# Patient Record
Sex: FEMALE | Race: WHITE | NOT HISPANIC OR LATINO | URBAN - METROPOLITAN AREA
[De-identification: names, ages, dates, MRNs, and addresses within clinical notes are randomized per-mention and may not be internally consistent; named-entity substitution may affect disease eponyms.]

---

## 2019-02-04 ENCOUNTER — RECORDS - HEALTHEAST (OUTPATIENT)
Dept: LAB | Facility: CLINIC | Age: 19
End: 2019-02-04

## 2019-02-05 LAB
O+P STL MICRO: NORMAL
SHIGA TOXIN 1: NEGATIVE
SHIGA TOXIN 2: NEGATIVE

## 2019-02-06 ENCOUNTER — RECORDS - HEALTHEAST (OUTPATIENT)
Dept: LAB | Facility: CLINIC | Age: 19
End: 2019-02-06

## 2019-02-06 LAB — G LAMBLIA AG STL QL IA: NORMAL

## 2019-02-07 LAB — BACTERIA SPEC CULT: NORMAL

## 2019-02-08 LAB
O+P STL MICRO: NORMAL
O+P STL MICRO: NORMAL

## 2019-10-21 ENCOUNTER — RECORDS - HEALTHEAST (OUTPATIENT)
Dept: LAB | Facility: CLINIC | Age: 19
End: 2019-10-21

## 2019-10-24 LAB
EBV EA-D IGG SER-ACNC: <0.2 AI (ref 0–0.8)
EBV NA IGG SER IA-ACNC: <0.2 AI (ref 0–0.8)
EBV VCA IGG SER IA-ACNC: <0.2 AI (ref 0–0.8)
EBV VCA IGM SER IA-ACNC: 0.6 AI (ref 0–0.8)

## 2019-11-30 ENCOUNTER — RECORDS - HEALTHEAST (OUTPATIENT)
Dept: LAB | Facility: CLINIC | Age: 19
End: 2019-11-30

## 2019-12-01 LAB
CMV DNA SPECIMEN TYPE: NORMAL
CMV QUANT IU/ML: NORMAL [IU]/ML
LOG IU/ML OF CMVQNT: NORMAL {LOG_IU}/ML

## 2019-12-02 LAB
CMV IGM SERPL IA-ACNC: <0.2 AI (ref 0–0.8)
EBV EA-D IGG SER-ACNC: <0.2 AI (ref 0–0.8)
EBV NA IGG SER IA-ACNC: <0.2 AI (ref 0–0.8)
EBV VCA IGG SER IA-ACNC: <0.2 AI (ref 0–0.8)
EBV VCA IGM SER IA-ACNC: 0.5 AI (ref 0–0.8)

## 2020-01-14 ENCOUNTER — RECORDS - HEALTHEAST (OUTPATIENT)
Dept: ADMINISTRATIVE | Facility: OTHER | Age: 20
End: 2020-01-14

## 2020-01-15 ENCOUNTER — HOSPITAL ENCOUNTER (OUTPATIENT)
Dept: ULTRASOUND IMAGING | Facility: CLINIC | Age: 20
Discharge: HOME | End: 2020-01-15
Attending: PEDIATRICS

## 2020-01-15 DIAGNOSIS — R10.812 LUQ ABDOMINAL TENDERNESS: ICD-10-CM

## 2024-07-31 ENCOUNTER — TELEPHONE (OUTPATIENT)
Dept: OTOLARYNGOLOGY | Facility: CLINIC | Age: 24
End: 2024-07-31

## 2024-07-31 NOTE — TELEPHONE ENCOUNTER
[facility] Call Center    Phone Message    May a detailed message be left on voicemail: yes     Reason for Call: Other: The pt's mom Loreto Mccurdy, is calling about her daughter. Jody has been Recommended by Kathya Milner.  The pt has social anxiety that is affecting her speech. She needs therapy as she is holding her breathe then tenses up, so that she has problems with projectin her voice, etc. Loreto thinks it is a mental block that created a habit. 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 Please call the pt's mom to discuss if you could see/help this pt. Thanks.     Action Taken: Message routed to:  [facility] ENT    Travel Screening: Not Applicable     Date of Service:

## 2024-08-01 NOTE — TELEPHONE ENCOUNTER
I tried to contact the family. I have 8/20 on hold for her to have a new 2 hour.  If you try to contact them, please be clear that my next available is after she leaves, so it is important for her to try to make the 8/20 appointment.    Thank you! -Jody

## 2024-08-06 ENCOUNTER — TELEPHONE (OUTPATIENT)
Dept: OTOLARYNGOLOGY | Facility: CLINIC | Age: 24
End: 2024-08-06

## 2024-08-09 ENCOUNTER — THERAPY VISIT (OUTPATIENT)
Dept: OTOLARYNGOLOGY | Facility: CLINIC | Age: 24
End: 2024-08-09

## 2024-08-09 DIAGNOSIS — R49.0 DYSPHONIA: Primary | ICD-10-CM

## 2024-08-09 PROCEDURE — 92524 BEHAVRAL QUALIT ANALYS VOICE: CPT | Mod: GN | Performed by: SPEECH-LANGUAGE PATHOLOGIST

## 2024-08-09 PROCEDURE — 92507 TX SP LANG VOICE COMM INDIV: CPT | Mod: GN | Performed by: SPEECH-LANGUAGE PATHOLOGIST

## 2024-08-09 PROCEDURE — 31579 LARYNGOSCOPY TELESCOPIC: CPT | Performed by: SPEECH-LANGUAGE PATHOLOGIST

## 2024-08-09 PROCEDURE — 92520 LARYNGEAL FUNCTION STUDIES: CPT | Mod: GN | Performed by: SPEECH-LANGUAGE PATHOLOGIST

## 2024-08-09 NOTE — PATIENT INSTRUCTIONS
"After Visit Summary    Patient: Emily Mccurdy  Date of Visit: 2024    These notes are also available in your MyChart. Please take a few moments to find them under \"Past Appointments\" in the [employer] system, as Jody will start to phase out e-mail communications.    \"Handouts\" that go along with today's order of activities include (below):   Frequency of practice: 2-3x/day unless marked otherwise    Order of today's appointment:  Breathing:   In the morning and evening (twice daily) for 2-5 minutes:   Breathe while lying on your back with your face and knees up. Hands on tummy and chest.  Take a breath in with rounded lips and exhale with a  shhhhhh    Inhale  = Inflate; exhale = deflate  3x each: try breathin in/8 out (5/10, 3/4)  Throughout the day (2-3x/day for just a couple minutes) check breathing while keeping shoulders relaxed (riding to and from school, etc.)    Pulsed breathing Exercise (2-3x/day):   3x each  Sh, Sh, Sh...  S, S, S...  F, F, F.....  Now, all three combined Sh, Sh, Sh, S, S, S, F, F, F (tummy IN for each sound)    Shh exercise (loudness) : 1) one on one loudness 2) 2-3 people 3) 5-6 people loudness.  Feel breath in the abdominal area and not in the throat.  Then, try \"Hey\" at different loudness.     Breath Pacing:   Coordinate sipping breath with counting:  Breathe in through rounded lips with tongue behind lower teeth or touching at the bump behind upper teeth:  Breathe in through rounded lips, then speak \"1\"  Breathe in through rounded lips, then speak  \"1-2\"  Breathe in through rounded lips, then speak  \"1-2-3\"  Breathe in through rounded lips, then speak  \"1-2-3-4\"... up to 5, and then keep going up to 10      Breathing Techniques:  1. Breathing in through rounded lips and out with a \"sh\"  2. Breathing in through the nose and out with \"sh\"  Breathing Tips  Keep shoulders down and chest relaxed    When the sunlight strikes raindrops in the air,/ they act like a prism and form a " "rainbow. / The rainbow is a division of white light/ into many beautiful colors./  These take the shape of a long round arch,/ with its path high above,/ and its two ends apparently ' beyond the horizon./  There is, according to legend, /a boiling pot of gold at one end. / People look, but no one ever finds it./  When a man looks for something beyond his reach,/ his friends say/ he is looking for the pot of gold at the end of the rainbow./         Cup and Bubble Exercises   WHY: Though these exercises seems (and feels) silly they are helpful for a number of reasons. First, they make you use your air generously and consistently, helping you to coordinate your breath and your voice. Second, they lengthen and narrow the vocal tract with the straw. This narrowing (or semi-occlusion in scientific terms) creates back pressure in your throat which has been shown to help the vocal folds vibrate more easily and reduce how hard some of the other muscles are squeezing.   HOW:   With Water - Fill the cup (or bottle) about 2 inches full of water. Blow bubbles through the straw while keeping your voice on. (kind of like making an \"ooooo\" sound through straw).Keep the water bubbling the whole time. That means your air is moving consistently.   Without Water - make sound through the straw (like it's a kazoo). Make sure you are using your air freely. You can hold your hand in front of the straw to test, and you should be able to feel the air moving.   Use the \"w\" sound without the straw. Let your cheeks puff up slightly (like Ahsan Mann). Maintain the relaxed feeling in your throat while focusing on a sense of buzz at your lips.   After easy sounds listed below speak through the straw (with or without water) using every day phrases and counting to help bridge between the exercises and everyday voice use.   Feel how open and relaxed your throat feels when you practice these sounds. If the bubbling or air stops or it gets tight, " don't sweat it. Just breath, relax, and start again. Across all the exercises make sure you are getting a nice low breath and feeling the steady inward motion of low abdominal muscles when making sound.   Practice 5 times a day for no more than 3 minutes, and whenever you feel fatigued.   Aren't sure if you are doing it right? Ask yourself these three questions:   1. Does it feel easy?   2. Are the bubbles and voice consistent?   3. Do you feel that forward buzz?     What sounds to make:   Single pitches - use any comfortable pitch and sustain the note for as long as it feels free and easy, and your lips or tongue are bubbling.   Sighs - glide from high to low like you are sitting down in a comfortable chair after a long day of work   [place] - Start on a medium pitch and glide up just a bit and back down   On and off - use a comfortable pitch near where you speak. Keep the bubbles moving consistently while turning the voice on and off. Recognize how little work you need to do to get the voice working.         Jody Schwartz M.M. (voice), M.A., CCC/SLP  Speech-Language Pathologist  [facility] Certified Vocologist  [facility]  [email or web address]  she/her    Muscle Tension Dysphonia    One of the most common voice disorders we treat at the [facility] is muscle tension dysphonia (MTD).  The root word phon means  sound .  Phonation refers to the sound made by the voice.  The term dysphonia means there is something wrong with the voice.  However, muscle tension dysphonia can also refer to a voice that sounds normal but causes pain, discomfort, or fatigue to the voice user.  MTD is known as a functional disorder; that is, there is nothing structurally wrong with the voice.  Rather, the muscles do not function properly, which causes poor sound, discomfort, or increased effort.    Symptoms of MTD    Different individuals may have very different symptoms of MTD.  Possible voice characteristics include     rough, hoarse,  gravelly, raspy     weak, breathy, airy, leaky, backward, hollow     strained, pressed, squeezed, tight, tense, choked, effortful     jerky, shaky, halting,     suddenly cutting out, squeezing shut, breaking off, changing pitch, or fading away     giving out gradually, or becoming weaker or more tense as voice use continues     excessively high or low pitch     inability to produce a loud or clear voice     inability to sing notes that used to be easy    Possible sensations of MTD include     pain or discomfort anywhere in the throat  area associated with voice use     a tight choking sensation with voice use     fatigue or effort that increases with voice use     some area of the neck becoming tender to the touch     feeling a need to clear the throat frequently     a feeling of a lump in the throat    Causes of Muscle Tension Dysphonia  There are many specific, individual reasons why use of the vocal mechanism becomes abnormal.  Some common causes are prolonged illness, prolonged voice overuse, prolonged voice underuse (such as after a surgery), and trauma, such as an injury, chemical exposure, or an emotionally traumatic event.  These lead to an abnormal vocal response, causing the individual to compensate by using extra effort while talking.    The onset of MTD can be very subtle.  The individual is usually unaware of the extra effort, but this extra effort typically recruits muscles that are not part of the larynx (also known as the voice box).  The result may or may not be a stronger voice, but a vicious cycle usually starts in which more and more effort is required.  This cycle may continue for months or even years before the individual becomes aware that the voice is abnormal.    Treatment of MTD  Functional voice therapy is usually the only treatment available for MTD.  The amount of time required to complete functional therapy varies from only a few sessions to many months, but generally some relief is  "gained in the first 4 to 6 sessions.  In cases of emotional stress, counseling or stress management may be helpful or even necessary.    Occasionally, medical or surgical treatments may be tried.  Botox injections may be useful in severe cases.  Surgery has been tried in very few cases but is not done at the [facility].  Muscle relaxants are NOT useful for muscle tension dysphonia.  The action of these drugs is not localized to the vocal mechanism, so in order to provide enough relaxation for the vocal mechanism, the individual is often unable to function for day-to-day living.    Types of Muscle Tension Dysphonia  Muscle tension in the vocal mechanism can be exhibited in many ways.  Each individual is different, but a few common patterns are:     Anterior-posterior constriction     Hyperabduction     Hyperadduction     Pharyngeal constriction     Ventricular phonation     Vocal fold bowing (explained in another brochure)     back           r  i  g  h  t       front   A cross-section of the larynx (voice box) as seen from above.  This diagram may be helpful for visualizing the descriptions below.    Anterior-Posterior Constriction  In anterior-posterior constriction, the arytenoid cartilages bend forward during voice use, and/or the epiglottis bends backwards, causing the larynx to squeeze from front to back.  As effort increases, squeezing continues until the vocal folds (also called vocal cords) cannot be seen and may be unable to vibrate.  In extreme cases, especially in children, the arytenoids may actually vibrate against the epiglottis. The sound of the voice for someone with anterior-posterior constriction could range from normal to extremely squeezed and tight.  The voice may sound rough if the squeezing causes irregular vibration of the vocal folds.  Some experience a \"froggy\" sound if the arytenoids and epiglottis vibrate.  Someone with anterior-posterior constriction may complain of discomfort, " increasing pain during voice use that may remain during rest, or fatigue and decline of voice quality as the voice is used more and more.    Hyperabduction  Abduction is the term for the vocal folds coming apart during breathing.  When a person has hyperabduction, the vocal folds do not sufficiently come together to produce voice.  They may appear to be pulled apart as the person phonates.  An emotional component may be present with this type of MTD.    A person with hyperabduction may have a weak, breathy, airy, very soft, or hollow voice, sometimes with breaks in phonation.  He or she may experience effort and fatigue.  Often times, functional therapy is combined with psychotherapy to treat hyperabduction.  In very severe cases, Botox injections are also a useful treatment.    Hyperadduction  In hyperadduction, the vocal folds adduct (come together) excessively tightly, restricting airflow during phonation.  The larynx may look normal in an exam, but the sound and sensation are not.    The sound of a voice with hyperadduction ranges from normal to extremely tight, pressed, squeezed, strangled, forced or effortful.  The muscle tension may be irregular, causing a stopping/starting or shaking effect.  A person with hyperadduction may experience pain, discomfort, and effort and fatigue, usually increasing with continued voice use.  In very severe cases, Botox injections are helpful    Pharyngeal Constriction  During pharyngeal constriction, muscles of the pharynx (throat) contract excessively during voice use, making the throat very constricted.  The sound of the voice ranges from normal to very tight or squeezed.  It may be tremulous or throaty sounding.  A person with pharyngeal constriction may experience discomfort, pain, fatigue, or declining voice quality with use.  Pain usually increases with voice use but may remain during rest.  Sometimes emotional stress can provoke pharyngeal constriction.    Ventricular  Phonation  This type of MTD is also called plica ventricularis, ventricular dysphonia, or false cord phonation.  The ventricular folds come together and vibrate instead of, or along with, the vocal folds.  The ventricular folds, also known as the false vocal cords, are mounds of fleshy tissue just above the vocal folds.  Though the ventricular folds are not muscular, they can be brought together and vibrated.  However, they were not meant to vibrate, so they cannot produce high pitches or loud sounds.  Pressure from the ventricular folds is usually strong enough to keep the true vocal folds from vibrating.  Most often, ventricular phonation is caused by continued voice use while true vocal folds are impaired, though sometimes extreme strain in response to a trauma is the cause.    Ventricular phonation sounds very rough and strained, sometimes inhuman, limited in pitch and volume.  One who uses ventricular phonation may experience fatigue (especially with attempts at loud voice use), pain or dryness with phonation.  However, sometimes no discomfort will be sensed at all.  In extreme cases, medical or surgical treatments may be tried to treat ventricular phonation, but only after functional therapy has failed.  In some cases, ventricular phonation is the best alternative for persons whose true vocal folds will always be too impaired to vibrate.

## 2024-08-09 NOTE — PROGRESS NOTES
"[facility]  [facility] - Dept. of Otolaryngology   Evaluation report - IN PERSON APPOINTMENT    Clinician: WILVER Olson.STACIA VELAZQUEZ (voice), M.A., CCC-SLP  Referring physician:  Self  Patient: Emily Mccurdy  Date of Visit: 8/9/2024  # of Visits: 1  # of Therapy sessions: 1    Benefit & Certification period: n/a  No insurance.  They have acknowledged and I will ask Eric Brenda to reach out to the family.      HISTORY    Chief complaint: Calli is a 23 year old presenting today for evaluation of breath coordination with phonation/ muscle tension difficulties.  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    Salient history: She has a history significant for a long Hx of social anxiety disorder that affected her voice and speech. In message prior to her appointment, Emily stated:  \"The problem is not mechanical/structural, and I have confirmed with several doctors that there is no impediment to my breathing or speech. My guess would be that it s more of a mental block-- my chest and throat consistently constrict when I try to talk, and I end up with a sore throat. I feel as though I ve closed my nose off and speak in one single exhale.    I think I m trying so hard to sound a certain way (ie, friendly, natural, upbeat), that I ve forgotten how to speak normally.\"    We were joined by: her mother, Loreto  CURRENT SYMPTOMS INCLUDE  VOICE:   Onset: tightness / chest is tight before speaking started 1.5 years ago.  Was tiking a tripl in 2023./ severe social anxiety.   Long Hx of a social anxiety disorder that makes her speak in a constant \"high pitch Naveen remigio\" voice.  So, in more recent years, she \"tried\" to \"push\" her voice into a lower place that sounded more authentic to her, but led to tension and vocal fatigue.   Lots of tenderness in the TH space. Breath is shallow, and she often finds herself trying to get " "her words out on one breath.  Develped an alterego and high pitched voice to talk with a squeaky high pitch voice. Singled out and an anxiety reflex.   Trying to let it go more an more  3 years ago she tried to go back to her \"real\" voice.  Tried to force herself to sound nature. Forcing and trying too hard - over excited garret go back.  Stuffy nose, and semi-obstructive - little sollen and inflammed. Tried things. Feels like talking on one entire exhale and nose feels blocked.     Enjoys singing for fun.  3x musical drop out. An dthen a fwe years  Hums to herself - prabhakar clarke   Singing voice still short of breath and a mild sore throat. Enjoys it, but still similar results.   Public engagements and today sounds pretty competent sounding, but the rest of the day it is normal for 5 min and then she will start feeling tighter and more constrained. Focusing on rose and trying to sound sincere.    Used voice in school every day.  Sweet voice took a while to resolve  Speaking regularly and consistency - remote job was very understimulating - introvert, but needs to be social. Legal research nisha torres.     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 More hopeful and more stimulation.  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 Will not need to enforce dicipline   (gradual/sudden)  Code switching school and at home.  At home started to modify her voice for no reason.     THROAT ISSUES:   Effortful swallow  Soreness and pain    RESPIRATION:   Struggle for a long time, feeling insufficent air - yawn ing and light headed  Feedback look  O2 sats are ok  No problem.    SWALLOWING:   Takes longer than anyone to finish food, and needs to chew more  Difficulty breathing and eating at the same time.   No choking solids or liquids  Pills are difficult - prozac as a child and was difficult. Now able swallow, but still not fun. "     ADDITIONAL:  Reflux: father has reflux. No heartburn.     OTHER PERTINENT HISTORY  Otherwise unknown.  Please also refer to her chart for additional details.  No past medical history on file.  No past surgical history on file.    OBJECTIVE  PATIENT REPORTED MEASURES:   Patient Supplied Answers To VHI Questionnaire       No data to display                Patient Supplied Answers To CSI Questionnaire       No data to display                Patient Supplied Answers To EAT Questionnaire       No data to display                PERCEPTUAL EVALUATION (CPT 42714)  POSTURE / TENSION/ PALPATION OF THE LARYNGEAL AREA:   upper body    BREATHING:   appears within normal limits and adequate at rest  clavicular elevation on inspiration  shoulder and neck involvement  phonation is not coordinated with respiration  Intermittent yawning while conversing    LARYNGEAL PALPATION:   firm musculature  tenderness of the thyrohyoid area  reduced thyrohyoid space  left greater than right    VOICE:  Geok states that today is a typical voice today, with clinician observing voice quality to be characterized as:  Roughness: WNL  Breathiness: WNL  Strain: WNL  Pitch:  F0/Habitual/Conversational speech:  Mildly elevated  Voiced /i/ Max Phonation Time: informally judged as WNL  Voiceless /s/ max: informally judged as WNL  Pitch glide: neurologically normal  Maximum Phonation Time: 10+ seconds informally judged as WNL  Resonance:   Conversational speech:  mildly narrowed resonance  Loudness  Conversational speech:  WNL  Projected speech:  difficulty projecting, leads to strain  Singing vs. Speech:  n/a  GLOBAL ASSESSMENT OF DYSPHONIA: 30/100    COUGH/THROAT CLEARING:  Not observed/ present    LARYNGEAL FUNCTION STUDIES (CPT 92198)      /a/ mean f0 = 234.941 Hz (SD = 17.270)  /i/ mean f0 = 265.571 Hz (SD = 2.465)  Glide:     Lowest f0 = 84.092 Hz      Higest f0 = 872.410 Hz      Range = 788.318 Hz   Connected Speech     Mean f0 = 224.929 Hz (SD  33.187)     Median f0 = 220.725 Hz      Lowest f0 = 89.385 Hz      Highest f0 = 356.634 Hz      Speaking Range = 267.249 Hz         LARYNGEAL EXAMINATION  Procedure: Flexible endoscopy with chip-tip technology with stroboscopy, right nostril; topical anesthesia with 2% lidocaine and 0.025% oxymetazoline was sprayed into the nasal cavity and allowed 3 to 5 minutes for effect.  Performed by: WILVER Olson.STACIA VELAZQUEZ (voice), M.A., CCC-SLP  The laryngeal and pharyngeal structures were evaluated for gross appearance, mobility, function, and focal lesions / abnormalities of the associated mucosa.  Stroboscopy was warranted to evaluate closure, symmetry, and vibratory characteristics of the vocal folds.  All findings were within normal limits with the exception of the following salient features:     This exam shows the following:    Posterior commissure: no remarkable inflammation.  Secretions: WNL    Movement:  Right Vocal Fold: Normal  Left Vocal Fold: Normal  Supraglottic hyperfunction during connected speech tasks: moderate supraglottic hyperfunction, intermittent mild abduction, especially in upper range    Glottic Closure: complete  Upper Airway: Patent    Vocal Fold Findings:  Right Vocal Fold: WNL (within normal limits)  Left Vocal Fold: WNL           The laryngeal exam was reviewed with Ms. Mccurdy, and I provided pertinent              explanations, as well as written and oral information.     The addition of stroboscopy allowed evaluation of the mucosal wave:  Amplitude: right: WNL; left: WNL  Symmetry: good symmetry.  Closure pattern: complete.   Closure plane: at glottic level.   Phase distribution: normal.    The laryngeal exam was reviewed with Ms. Mccurdy, and I provided pertinent explanations, as well as written and oral information.     ASSESSMENT / PLAN  IMPRESSIONS: Emily Mccurdy is a 23 year old, presenting today with Laryngeal Hyperfunction (J38.7) and Dysphonia (R49.0) in the context of  an imbalance  "in respiratory mechanics and a long Hx of social anxiety disorder that is leading to not only muscle tension,  but an uncertainty in her voice identity, as she can often speak in a light voice, and other times will \"push\" to use a deeper/back focused voice when wanting to sound more mature , as evidenced by evaluation the results of the evaluation, laryngeal function studies, as well as the laryngeal exam.    The biofeedback from her exam was very helpful today.  We discussed muscle tension dysphonia and I responded to her questions.  We also discussed that it is ok to have set backs, because the muscle memory has been set over 20 years.  Remarkable findings included:  Perceptual evaluation demonstrated:   Roughness: WNL  Breathiness: WNL  Strain: WNL  Pitch:  F0/Habitual/Conversational speech:  Mildly elevated  Laryngeal exam demonstrated:   Supraglottic hyperfunction during connected speech tasks: moderate supraglottic hyperfunction, intermittent mild abduction, especially in upper range    Primary complaint of patient today included: dysphonia, difficulty speaking without discomfort, normal prosody and flow of voice and speech.       Therefore, we recommended a course of speech therapy to address these concerns.    Laryngeal function studies show significant increase in trans-glottal airflow compared to age and gender matched norms; inferred subglottal pressure and AVQI were WNL.    STIMULABILITY: results of therapy probes during perceptual and laryngeal evaluation demonstrate improvement with use of forward resonant stimuli, coordination of respiration and phonation, and use of yawn sigh    RECOMMENDATIONS:   A course of speech therapy is recommended to optimize vocal technique, promote reduced discomfort, effort and fatigue, and help reduce optimize respiratory mechanics.  She demonstrates a Good prognosis for improvement given adherence to therapeutic recommendations.   Positive indicators: positive response " to therapy probes diagnosis is known to respond to treatment high level of comittment  Negative indicators: none  Research: This patient is not willing to be contacted about participation in research. May be eligible for MTD study. She is leaving for [facility] in one month.    DURATION / FREQUENCY: 4 one-hour sessions.  A total of 6-8 sessions may be necessary.    GOALS:  Patient goal:   To improve and maintain a healthy voice quality  To understand the problem and fix it as much as possible  To have a normal and acceptable voice quality    Short-term goal(s): Within the first 4 sessions, Ms. Mccurdy:  will be able to independently list key factors in maintenance of good vocal hygiene with 80% accuracy, and report on their use outside the therapy room.  will utilize silent inhalation with good low-respiratory engagement 75% of the time during therapy tasks with minimal clinician support  will demonstrate semi-occluded vocal tract (SOVT) exercises with at least 80% accuracy with no clinician support  will initiate Resonant Voice Therapy (RVT)    Long-term goal(s): In 6 months, Ms. Mccurdy will:  Report resolution of symptoms, and use of optimal voice quality and comfort to meet personal, social, and professional needs, 90% of the time during a typical week of vocal activities    This treatment plan was developed with the patient who agreed with the recommendations.    _______________________________________________________________________  THERAPY NOTE (CPT 67905)  Date of Service: 8/9/2024    SUBJECTIVE / OBJECTIVE:  Please refer to my evaluation report from today's encounter for full details regarding subjective data, patient reported measures, and diagnostic findings.    THERAPEUTIC ACTIVITIES  Exercises to promote optimal respiratory mechanics  she demonstrated clavicular/neck/shoulder involvement in inhalation  Demonstrated difficulty allowing abdominal relaxation for inhalation  Practiced in a prone and supine position  "on the massage table, with tactile cue of a hand on the low rib-cage to facilitate awareness of low respiratory engagement.  Progressed to seated and standing today.  With clinician support, patient was able to demonstrate improved abdominal relaxation and engagement on inhalation  Optimal exhalation using inward engagement of the abdominal wall with no corresponding collapse of the upper chest cavity was trained using the pulsed \"sh\" task  [place] a respiratory pacing exercise; this was helpful  [place] a pulsed breathing exercise; this was helpful  acceptable improvement in airflow and respiratory mechanics    Semi-Occluded Vocal Tract (SOVT) exercises instructed to reduce laryngeal tension, promote vocal fold pliability, and coordinate respiration and phonation  Straw phonation with water resistance was found to be most facilitating   Sustained phonation, and voice vs. voiceless productions used to promote easy voicing and raise awareness of laryngeal tension  Ascending and descending glides utilized to promote vocal fold pliability  \"Messa di voce\", gradual crescendo and decrescendo to vary medial compression was also utilized to promote vocal fold pliability.  Instructed on the benefits of using these exercises for improved coordination of breath flow with phonation and tissue mobilization.  Instructed on the importance of using these exercises as a warm-up / cool down,  and to re-calibrate the voice throughout the day.    Counseling and Education  Asked many questions about the nature of her symptoms, and I answered all of these thoroughly.  Concepts of an optimal regimen for practice were instructed.  She should use an interval schedule of practice, with brief periods of practice frequently throughout each day  [place] concepts of volitional practice to facilitate motor learning.  A revised regimen for home practice was instructed.  I provided an audio recording and handouts of today's therapeutic activities " "to facilitate practice.    ASSESSMENT/PLAN  PROGRESS TOWARD LONG TERM GOALS:   Adequate progress; too early for objective measures    IMPRESSIONS:  Laryngeal Hyperfunction (J38.7) and Dysphonia (R49.0) in the context of an imbalance in respiratory mechanics and a long Hx of social anxiety disorder that is leading to not only muscle tension, but an uncertainty in her voice identity, as she can often speak in a light voice, and other times will \"push\" to use a deeper/back focused voice when wanting to sound more mature. Ms. Mccurdy demonstrated acceptable early learning of techniques to optimize her respiratory mechanics and coordination between breath flow and phonation.    PLAN: Ms. Mccurdy will continue with Ms. Chambers, and I will also try to see her one more time before she leaves.  I will connect with Ms. Chambers to help coordinate an intensive session of therapy.  We also acknowledged that progress will be somewhat limited, as she is working with a long time voice concern associated with her Hx of social anxiety, but we will give her a good foundation to continue working towards her goals while in [facility].    TOTAL SERVICE TIME: 120 minutes  EVALUATION OF VOICE AND RESONANCE (97386)  TREATMENT (15726)  LARYNGEAL FUNCTION STUDIES (24875)  ENDOSCOPIC LARYNGEAL EXAMINATION WITH STROBOSCOPY (59153)  NO CHARGE FACILITY FEE (50346)      Jody Schwartz M.M. (voice), M.A., CCC/SLP  Speech-Language Pathologist  SVI Trained Vocologist  [facility]  [phone]  [email or web address]  Pronouns: she/her      *this report was created in part through the use of computerized dictation software, and though reviewed following completion, some typographic errors may persist.  If there is confusion regarding any of this notes contents, please contact me for clarification        Let me know if you have additional questions. I will try to finish the report soon! -Jody"

## 2024-08-22 ENCOUNTER — OFFICE VISIT (OUTPATIENT)
Dept: OTOLARYNGOLOGY | Facility: CLINIC | Age: 24
End: 2024-08-22

## 2024-08-22 DIAGNOSIS — R49.0 DYSPHONIA: Primary | ICD-10-CM

## 2024-08-22 PROCEDURE — 92507 TX SP LANG VOICE COMM INDIV: CPT | Mod: GN | Performed by: SPEECH-LANGUAGE PATHOLOGIST

## 2024-08-22 NOTE — PATIENT INSTRUCTIONS
"After Visit Summary    Patient: Emily Mccurdy  Date of Visit: 8/22/2024    These notes are also available in your MyChart. Please take a few moments to find them under \"Past Appointments\" in the [facility] system, as Jody will start to phase out e-mail communications.    \"Handouts\" that go along with today's order of activities include (below):   Frequency of practice: 2-3x/day unless marked otherwise    \"To sir with love\"    Order of today's appointment:  \"Neutral face\"  - reduce the smile    Focus on the \"end\" U-words below (few-chew). Descending glide pattern (little higher to a little lower). Stomach a little out and then sucking in a      Phrases for Blending   fall over  go into   put upon   leave on  the only  lose it   see it   the other  win it   do it   not even  that's enough   put on   not any  leave open   down under  cold as   one of   not old   the ice   she's ill   look at   he's ill   then add   The(e) (y)end  yes and  so it   high up  one at   [place] is    Fall_over_the_chair                      Go_(w)into the store  Leave_on_the_lights                     The(e)_(y)only one  See_it_over_there                         The(e)_(y)other day  Do_it_now    Not_even her mom  Put(d)_on_your_shoes         Not_any more  Down_under_the_stairs  Cold_as ice  Not_old_enough   the ice is cold  Look_at_the_sky   he's (z)ill with the flu  The_end_of_the_story  yes_and no  High_up_on_the_shelf  one at a time          Jody Schwartz M.M. (voice), M.A., CCC/SLP  Speech-Language Pathologist  [employer] Certified Vocologist  [facility]  [email or web address]  she/her    "

## 2024-09-02 NOTE — PROGRESS NOTES
"[facility]  THERAPY NOTE (CPT 78198) -IN PERSON    Patient: Emily Mccurdy  Date of Service: 8/22/2024  # of Visits: 2  # of Therapy sessions: 2  Benefit & Certification period: n/a. Out of Pocket    Referring physician: self  Impressions from most recent evaluation (8/9/24):  \"IMPRESSIONS: Emily Mccurdy is a 23 year old, presenting today with Laryngeal Hyperfunction (J38.7) and Dysphonia (R49.0) in the context of  an imbalance in respiratory mechanics and a long Hx of social anxiety disorder that is leading to not only muscle tension,  but an uncertainty in her voice identity, as she can often speak in a light voice, and other times will \"push\" to use a deeper/back focused voice when wanting to sound more mature , as evidenced by evaluation the results of the evaluation, laryngeal function studies, as well as the laryngeal exam.    The biofeedback from her exam was very helpful today.  We discussed muscle tension dysphonia and I responded to her questions.  We also discussed that it is ok to have set backs, because the muscle memory has been set over 20 years.  Remarkable findings included:  Perceptual evaluation demonstrated:   Roughness: WNL  Breathiness: WNL  Strain: WNL  Pitch:  F0/Habitual/Conversational speech:  Mildly elevated  Laryngeal exam demonstrated:   Supraglottic hyperfunction during connected speech tasks: moderate supraglottic hyperfunction, intermittent mild abduction, especially in upper range     Primary complaint of patient today included: dysphonia, difficulty speaking without discomfort, normal prosody and flow of voice and speech.       Therefore, we recommended a course of speech therapy to address these concerns.    SUBJECTIVE:  Since her last session, Ms. Mccurdy reports the following:   Overall she reports that symptoms are stable  She found the early recommendations and activities to be helpful    OBJECTIVE:  Ms. Mccurdy presents today with the following:    Voice " "quality:  Roughness: WNL  Breathiness: WNL  Strain: WNL  Pitch:  F0/Habitual/Conversational speech:  Mildly elevated      PATIENT REPORTED MEASURES:  Patient Supplied Answers To SLP QOL Questionnaire       No data to display              Patient Supplied Answers To VHI Questionnaire      8/22/2024     9:57 AM   Voice Handicap Index (VHI-10)   My voice makes it difficult for people to hear me 3   People have difficulty understanding me in a noisy room 3   My voice difficulties restrict my personal and social life.  3   I feel left out of conversations because of my voice 2   My voice problem causes me to lose income 1   I feel as though I have to strain to produce voice 4   The clarity of my voice is unpredictable 3   My voice problem upsets me 3   My voice makes me feel handicapped 3   People ask, \"What's wrong with your voice?\" 2   VHI-10 27         THERAPEUTIC ACTIVITIES  Demonstrated previous exercises.  demonstrated improved technique  appropriate redirection provided  instruction provided for increased level of complexity/difficulty    Exercises in techniques for improved airflow during phonation  Speech material with /ju/ glides was facilitating at the word level.  Progressed to easy onset/ flow, and blending phrases  Instructed with a descending 5th, as well as an arpeggio pattern; this was helpful.  [place] techniques to reduce glottal rose and improve breath flow; negative practice improved awareness today.  Applied these techniques while maintaining a neural face; this was helpful.    Counseling and Education:  Asked many questions about the nature of her symptoms, and I answered all of these thoroughly.  A revised regimen for home practice was instructed.  I provided AVS of today's therapeutic activities to facilitate practice.    ASSESSMENT/PLAN  PROGRESS TOWARD LONG TERM GOALS:   Adequate progress; too early for objective measures    IMPRESSIONS:  i Laryngeal Hyperfunction (J38.7) and Dysphonia (R49.0) " "in the context of an imbalance in respiratory mechanics and a long Hx of social anxiety disorder that is leading to not only muscle tension, but an uncertainty in her voice identity, as she can often speak in a light voice, and other times will \"push\" to use a deeper/back focused voice when wanting to sound more mature. Ms. Calli josephkstrated good learning of flow exercises.  She demonstrated good learning today. She would like to continue to work with me before she leaves the country.     PLAN: I will see Ms. Mccurdy in early September weeks, at which point we will continue therapy.   For practice goals see AVS.     Next Clinic Appt: TBD    TOTAL SERVICE TIME: 60 minutes  TREATMENT (58289): 60 minutes  NO CHARGE FACILITY FEE (85750)    Jody Schwartz M.M. (voice) MMICHAEL., CCC/SLP  Speech-Language Pathologist  Certificate of Vocology  [facility]  [phone]  [email or web address]  Pronouns: she/her  "

## 2024-10-05 ENCOUNTER — HEALTH MAINTENANCE LETTER (OUTPATIENT)
Age: 24
End: 2024-10-05